# Patient Record
Sex: FEMALE | Race: WHITE | NOT HISPANIC OR LATINO
[De-identification: names, ages, dates, MRNs, and addresses within clinical notes are randomized per-mention and may not be internally consistent; named-entity substitution may affect disease eponyms.]

---

## 2023-04-24 PROBLEM — Z00.00 ENCOUNTER FOR PREVENTIVE HEALTH EXAMINATION: Status: ACTIVE | Noted: 2023-04-24

## 2023-04-25 ENCOUNTER — TRANSCRIPTION ENCOUNTER (OUTPATIENT)
Age: 33
End: 2023-04-25

## 2023-04-25 ENCOUNTER — APPOINTMENT (OUTPATIENT)
Dept: OTOLARYNGOLOGY | Facility: CLINIC | Age: 33
End: 2023-04-25
Payer: COMMERCIAL

## 2023-04-25 VITALS
SYSTOLIC BLOOD PRESSURE: 117 MMHG | HEART RATE: 59 BPM | WEIGHT: 116 LBS | DIASTOLIC BLOOD PRESSURE: 86 MMHG | TEMPERATURE: 97.2 F | HEIGHT: 63 IN | BODY MASS INDEX: 20.55 KG/M2

## 2023-04-25 DIAGNOSIS — J32.9 CHRONIC SINUSITIS, UNSPECIFIED: ICD-10-CM

## 2023-04-25 PROCEDURE — 99203 OFFICE O/P NEW LOW 30 MIN: CPT | Mod: 25

## 2023-04-25 PROCEDURE — 31231 NASAL ENDOSCOPY DX: CPT | Mod: 52

## 2023-04-25 PROCEDURE — 99204 OFFICE O/P NEW MOD 45 MIN: CPT | Mod: 25

## 2023-04-25 RX ORDER — METHYLPREDNISOLONE 4 MG/1
4 TABLET ORAL
Qty: 1 | Refills: 0 | Status: ACTIVE | COMMUNITY
Start: 2023-04-25 | End: 1900-01-01

## 2023-04-25 RX ORDER — AMOXICILLIN AND CLAVULANATE POTASSIUM 875; 125 MG/1; MG/1
875-125 TABLET, COATED ORAL
Qty: 20 | Refills: 0 | Status: ACTIVE | COMMUNITY
Start: 2023-04-25 | End: 1900-01-01

## 2023-04-25 RX ORDER — FLUTICASONE PROPIONATE 50 UG/1
50 SPRAY, METERED NASAL DAILY
Qty: 1 | Refills: 3 | Status: ACTIVE | COMMUNITY
Start: 2023-04-25 | End: 1900-01-01

## 2023-04-26 PROBLEM — J32.9 OTHER SINUSITIS: Status: ACTIVE | Noted: 2023-04-26

## 2023-04-26 NOTE — CONSULT LETTER
[Dear  ___] : Dear  [unfilled], [Courtesy Letter:] : I had the pleasure of seeing your patient, [unfilled], in my office today. [Consult Closing:] : Thank you very much for allowing me to participate in the care of this patient.  If you have any questions, please do not hesitate to contact me. [Sincerely,] : Sincerely, [FreeTextEntry3] : Jamie Dubon MD\par Department of Otolaryngology - Head and Neck Surgery\par [facility]

## 2023-04-26 NOTE — PROCEDURE
[FreeTextEntry3] : Nasal Endoscopy:\par diffuse white mucus throughout, right>left\par underlying mild polypoid edema\par thick white postnasal drip

## 2023-04-26 NOTE — ASSESSMENT
[FreeTextEntry1] : 33F here for initial evaluation. Last week she was sick w bad URI w congestion and sore throat. Thereafter her olfaction and taste began to become dull and she also has some ear pressure with postnasal drip and facial pressure. The congestion has gotten a little better but the other sx remain. She has allergies and takes zyrtec as needed; shots had been recommended but she cannot do them. Of note, she takes dupixent for eczema, but has been off it for 1.5mo for insurance related issues.\par On exam, both TMs are retracted and hypomobile (left>right), but middle ear is clear. Nasal endoscopy shows\par diffuse white mucus throughout (right>left) with underlying mild polypoid edema and thick white postnasal drip.\par She has an acute sinusitis on exam with thick postnasal drip and eustachian tube dysfunction; there is no otitis on exam. She has now had sx >1 week - will start on augmentin/medrol with flonase. Sx should improve over the next few days. She will let me know if sx persist/worsen.

## 2023-04-26 NOTE — PHYSICAL EXAM
[FreeTextEntry1] : Au: EAC clear, TM intact and retracted, hypomobile (left>right), ME clear  [Nasal Endoscopy Performed] : nasal endoscopy was performed, see procedure section for findings [Midline] : trachea located in midline position [Normal] : no rashes

## 2023-04-26 NOTE — HISTORY OF PRESENT ILLNESS
[de-identified] : 33F here for initial evaluation.\par \par Last week she was sick w bad URI w congestion and sore throat. Thereafter her olfaction and taste began to become dull and she also has some ear pressure with postnasal drip and facial pressure. The congestion has gotten a little better.\par She has allergies and takes zyrtec as needed; shots had been recommended but she cannot do them.\par \par Of note, she takes dupixent for eczema, but has been off it for 1.5mo for insurance related issues.\par \par ROS otherwise unremarkable.

## 2023-04-30 LAB — EAR NOSE AND THROAT CULTURE: ABNORMAL

## 2024-07-30 ENCOUNTER — APPOINTMENT (OUTPATIENT)
Dept: OTOLARYNGOLOGY | Facility: CLINIC | Age: 34
End: 2024-07-30

## 2024-08-02 ENCOUNTER — APPOINTMENT (OUTPATIENT)
Dept: OTOLARYNGOLOGY | Facility: CLINIC | Age: 34
End: 2024-08-02